# Patient Record
Sex: MALE | Race: BLACK OR AFRICAN AMERICAN
[De-identification: names, ages, dates, MRNs, and addresses within clinical notes are randomized per-mention and may not be internally consistent; named-entity substitution may affect disease eponyms.]

---

## 2020-07-23 ENCOUNTER — HOSPITAL ENCOUNTER (OUTPATIENT)
Dept: HOSPITAL 5 - XRAY | Age: 60
Discharge: HOME | End: 2020-07-23
Attending: UROLOGY
Payer: COMMERCIAL

## 2020-07-23 DIAGNOSIS — N13.2: Primary | ICD-10-CM

## 2020-07-23 PROCEDURE — 74150 CT ABDOMEN W/O CONTRAST: CPT

## 2020-07-27 NOTE — CAT SCAN REPORT
CT ABDOMEN AND PELVIS WITHOUT CONTRAST



HISTORY: KIDNEY STONE.



COMPARISON: None.



TECHNIQUE: CT images of the abdomen and pelvis were obtained without administration of intravenous co
ntrast.  All CT scans at this location are performed using CT dose reduction for ALARA by means of au
tomated exposure control. 



FINDINGS:



Lungs/bones:  Lung bases are clear. There are degenerative changes in the spine and pelvis with no ac
doug osseous abnormality.



Abdomen/pelvis:  There is a 9 mm stone in the mid right ureter with moderate hydronephrosis. Addition
al bilateral nonobstructing renal calyceal calculi are present as well as a somewhat linear-appearing
 stone in the left renal pelvis measuring 1.9 cm in length and 7 mm in width. There is mild collectin
g system prominence but no significant inflammatory change.



The liver, gallbladder, spleen, pancreas, adrenals, and proximal GI tract appear unremarkable.



Urinary bladder is collapsed. Prostate is unremarkable. No pelvic free fluid or acute colonic abnorma
lity identified. There are small bilateral fat-containing inguinal hernias.



IMPRESSION:

1. Renal stone disease as outlined above, notable for a 9 mm obstructive stone in the mid right urete
r with moderate hydronephrosis.



Signer Name: Karel Shepard MD 

Signed: 7/27/2020 9:08 PM

Workstation Name: [id number]

## 2020-09-17 ENCOUNTER — HOSPITAL ENCOUNTER (OUTPATIENT)
Dept: HOSPITAL 5 - OR | Age: 60
Discharge: HOME | End: 2020-09-17
Attending: UROLOGY
Payer: COMMERCIAL

## 2020-09-17 VITALS — SYSTOLIC BLOOD PRESSURE: 136 MMHG | DIASTOLIC BLOOD PRESSURE: 82 MMHG

## 2020-09-17 DIAGNOSIS — N20.1: Primary | ICD-10-CM

## 2020-09-17 DIAGNOSIS — Z98.890: ICD-10-CM

## 2020-09-17 DIAGNOSIS — Z79.899: ICD-10-CM

## 2020-09-17 DIAGNOSIS — Z72.89: ICD-10-CM

## 2020-09-17 DIAGNOSIS — Z87.440: ICD-10-CM

## 2020-09-17 DIAGNOSIS — F17.210: ICD-10-CM

## 2020-09-17 PROCEDURE — 50590 FRAGMENTING OF KIDNEY STONE: CPT

## 2020-09-17 NOTE — DISCHARGE SUMMARY
Short Stay Discharge Plan


Activity: other (no straining )


Diet: regular


Special Instructions: other (inc fluids )


Durable Medical Equipment Needed Upon Discharge: other (has stent )


Additional Instructions: 


APPOINTMENT  - CALL DR ROBINS OFFICE [phone] FOR APPOINTMENT TO BE SEEN 

IN 7 DAYS.   CALL HIS OFFICE IF YOU HAVE ANY QUESTIONS OR CONCERNS RELATED TO 

YOUR SURGERY.





DIET - LOW FAT,  LOW SALT, LOW CHOLESTEROL.  DRINK 8-10 CUPS OF WATER PER DAY





ACTIVITY-   NO STRENUOUS ACTIVITIES.   TAKE DAILY WALKS





DO STRAIN ALL URINE  TAKE SEDIMENTS WHEN YOU GO FOR YOUR FOLLOW UP VISIT WITH DR DUMAS


Follow up with: 


PRIMARY CARE,MD [Primary Care Provider] - 7 Days


Forms:  [facility]

## 2020-09-17 NOTE — ANESTHESIA CONSULTATION
Anesthesia Consult and Med Hx


Date of service: 09/17/20





- Airway


Anesthetic Teeth Evaluation: Caps (dey crowns), Bridges (upper front)


ROM Head & Neck: Adequate


Mental/Hyoid Distance: Adequate


Mallampati Class: Class II


Intubation Access Assessment: Probably Good





- Pre-Operative Health Status


ASA Pre-Surgery Classification: ASA2


Proposed Anesthetic Plan: General





- Pulmonary


Hx Smoking: Yes (quit over 10 years ago)


Hx Asthma: No


Hx Respiratory Symptoms: No


COPD: No


Hx Sleep Apnea: No (LAURA PRE SCREEN LOW RISK)





- Cardiovascular System


Hx Hypertension: No


Hx Heart Attack/AMI: No


Hx Percutaneous Transluminal Coronary Angioplasty (PTCA): No


Hx Cardia Arrhythmia: No





- Central Nervous System


Hx Seizures: No


CVA: No


Hx Back Pain: Yes


Hx Psychiatric Problems: No





- Gastrointestinal


Hx Gastroesophageal Reflux Disease: No





- Endocrine


Hx Renal Disease: Yes (kidney stones)


Hx End Stage Renal Disease: No


Hx Cirrhosis: No


Hx Liver Disease: No


Hx Insulin Dependent Diabetes: No


Hx Non-Insulin Dependent Diabetes: No


Hx Thyroid Disease: No





- Hematic


Hx Anemia: No


Hx Sickle Cell Disease: No





- Other Systems


Hx Alcohol Use: Yes (BEER not every day)


Hx Substance Use: No


Hx Cancer: No


Hx Obesity: No

## 2020-09-17 NOTE — OPERATIVE REPORT
PREOPERATIVE DIAGNOSES:  Bilateral stones, previous stent placement.



POSTOPERATIVE DIAGNOSES:  Bilateral stones, previous stent placement.



PROCEDURE:  Right lithotripsy, ureteral.



SURGEON:  Harvinder Barrientos MD.



ANESTHESIA:  General.



FINDINGS:  This is a gentleman who presented with bilateral stones.  Stents were

placed.  He now presents for treatment of the large 9 mm mid ureteral stone.



DESCRIPTION OF PROCEDURE:  The patient was brought to the operating room and

placed on operating table.  Following induction of anesthesia, the stone was

well localized, both the AP and oblique image.  Shocks were begun at 1 kV,

increased to maximum of 9 kV.  There was excellent fragmentation.  The stone was

clearly much smaller than we started with.  The patient tolerated the procedure

well.  Stent was in good position.  He was gated part of the time because he had

some PVCs.  He tolerated the procedure well and brought to recovery in stable

condition.



Plan will be followup staged procedure, possible ureteroscopy.  The other option

is open surgery or laparoscopic surgery or percutaneous surgery.  This will be

discussed.  He was brought to recovery in stable condition.





DD: 09/17/2020 10:38

DT: 09/17/2020 10:58

JOB# [id number]  [id number]

HEDY/MIGUEL

## 2020-09-17 NOTE — POST OPERATIVE NOTE
Date of procedure: 09/17/20


Pre-op diagnosis: stones 


Post-op diagnosis: same


Findings: 





as above 


Procedure: 





r ureteral eswl 


Anesthesia: FELIPE


Surgeon: ARNAUD DUMAS


Estimated blood loss: none


Pathology: none


Condition: stable


Disposition: PACU

## 2020-11-09 ENCOUNTER — HOSPITAL ENCOUNTER (OUTPATIENT)
Dept: HOSPITAL 5 - OR | Age: 60
Discharge: HOME | End: 2020-11-09
Attending: UROLOGY
Payer: COMMERCIAL

## 2020-11-09 VITALS — SYSTOLIC BLOOD PRESSURE: 132 MMHG | DIASTOLIC BLOOD PRESSURE: 72 MMHG

## 2020-11-09 DIAGNOSIS — Z72.89: ICD-10-CM

## 2020-11-09 DIAGNOSIS — Z98.890: ICD-10-CM

## 2020-11-09 DIAGNOSIS — F17.210: ICD-10-CM

## 2020-11-09 DIAGNOSIS — Z87.440: ICD-10-CM

## 2020-11-09 DIAGNOSIS — Z79.899: ICD-10-CM

## 2020-11-09 DIAGNOSIS — N20.1: Primary | ICD-10-CM

## 2020-11-09 PROCEDURE — C1769 GUIDE WIRE: HCPCS

## 2020-11-09 PROCEDURE — C1758 CATHETER, URETERAL: HCPCS

## 2020-11-09 PROCEDURE — C2617 STENT, NON-COR, TEM W/O DEL: HCPCS

## 2020-11-09 PROCEDURE — 74420 UROGRAPHY RTRGR +-KUB: CPT

## 2020-11-09 PROCEDURE — 52356 CYSTO/URETERO W/LITHOTRIPSY: CPT

## 2020-11-09 NOTE — OPERATIVE REPORT
PREOPERATIVE DIAGNOSIS:  Bilateral obstructing stones.



POSTOPERATIVE DIAGNOSES:  Right ureteral stones.  Left stone was in the lower

pole.



PROCEDURE:  Cystoscopy, right retrograde, right ureteroscopy with laser of large

ureteral stones and removal of left double-J stent.



SURGEON:  Dr. Barrientos.



ANESTHESIA:  General.



FINDINGS:  This is a gentleman who presented with bilateral obstruction, severe

hydronephrosis on the right likely a diminished function in the right kidney. 

He now presents for treatment.  Preoperatively, they insisted that the stents be

removed.  I told him I would not remove the stent immediately on the right

because as we were working on, but I would if it looked like the ureter was

opened and the stone was in a decent spot and take out the left stent even

against my advice, but they insisted that he come out.  He does need followup

treatment on the left side.  This was explained to them in detail.



DESCRIPTION OF PROCEDURE:  The patient was brought to the operating table. 

Following induction of anesthesia, placed in lithotomy position, prepped and

draped in usual sterile fashion.  Cystourethroscopy revealed both stents.  The

right stent was partially withdrawn and a wire coiled in the kidney.  A second

wire coiled with a double lumen catheter and ureteroscopy showed a large stone

mid to upper ureter.  This was lasered into at least 6 pieces.  Some of the

pieces moved back in the kidney.  We followed them up.  At this point, a 7

double J coiled in the kidney.  The retrograde showed a very dilated system,

likely chronic obstruction.  The patient tolerated the procedure well.  The left

stent was removed at the patient's request.  He was brought to recovery room. 

We left the string, so he would not forget to take out the stent in stable

condition.





DD: 11/09/2020 11:43

DT: 11/09/2020 12:05

JOB# [id number]  [id number]

HEDY/MIGUEL

## 2020-11-09 NOTE — DISCHARGE SUMMARY
Short Stay Discharge Plan


Activity: other (no straining )


Weight Bearing Status: Full Weight Bearing


Diet: regular


Special Instructions: other (inc fluids )


Follow up with: 


PRIMARY CARE,MD [Primary Care Provider] - 7 Days


ARNAUD DUMAS MD [Staff Physician] - 7 Days

## 2020-11-09 NOTE — ANESTHESIA CONSULTATION
Anesthesia Consult and Med Hx


Date of service: 11/09/20





- Airway


Anesthetic Teeth Evaluation: Chipped


ROM Head & Neck: Adequate


Mental/Hyoid Distance: Adequate


Mallampati Class: Class III


Intubation Access Assessment: Probably Good





- Pre-Operative Health Status


ASA Pre-Surgery Classification: ASA1


Proposed Anesthetic Plan: General





- Pulmonary


Hx Smoking: Yes (Quit)


Hx Asthma: No


Hx Respiratory Symptoms: No


COPD: No


Hx Sleep Apnea: No (LAURA PRE SCREEN LOW RISK)





- Cardiovascular System


Hx Hypertension: No


Hx Heart Attack/AMI: No


Hx Percutaneous Transluminal Coronary Angioplasty (PTCA): No


Hx Cardia Arrhythmia: No





- Central Nervous System


Hx Seizures: No


CVA: No


Hx Back Pain: Yes


Hx Psychiatric Problems: No





- Gastrointestinal


Hx Gastroesophageal Reflux Disease: No





- Endocrine


Hx End Stage Renal Disease: No


Hx Cirrhosis: No


Hx Liver Disease: No


Hx Insulin Dependent Diabetes: No


Hx Non-Insulin Dependent Diabetes: No


Hx Thyroid Disease: No





- Hematic


Hx Anemia: No


Hx Sickle Cell Disease: No





- Other Systems


Hx Alcohol Use: Yes (BEER not every day)


Hx Substance Use: No


Hx Cancer: No


Hx Obesity: No





- Additional Comments


Anesthesia Medical History Comments: Lebanese speaking-son present at bedside 

to translate.

## 2020-11-09 NOTE — POST OPERATIVE NOTE
Date of procedure: 11/09/20


Pre-op diagnosis: ayanna victor 


Post-op diagnosis: same


Findings: 





as above 


Procedure: 





cysto ureteroscopu 


Anesthesia: GETA


Surgeon: ARNAUD DUMAS


Estimated blood loss: none


Pathology: none


Condition: stable


Disposition: PACU